# Patient Record
Sex: MALE | Race: WHITE | NOT HISPANIC OR LATINO | Employment: UNEMPLOYED | URBAN - NONMETROPOLITAN AREA
[De-identification: names, ages, dates, MRNs, and addresses within clinical notes are randomized per-mention and may not be internally consistent; named-entity substitution may affect disease eponyms.]

---

## 2017-06-01 ENCOUNTER — HOSPITAL ENCOUNTER (EMERGENCY)
Facility: HOSPITAL | Age: 37
Discharge: HOME | End: 2017-06-01
Attending: EMERGENCY MEDICINE | Admitting: EMERGENCY MEDICINE

## 2017-06-01 ENCOUNTER — DOCUMENTATION (OUTPATIENT)
Dept: EMERGENCY DEPT | Facility: HOSPITAL | Age: 37
End: 2017-06-01

## 2017-06-01 VITALS
DIASTOLIC BLOOD PRESSURE: 69 MMHG | HEART RATE: 102 BPM | TEMPERATURE: 98.2 F | WEIGHT: 160 LBS | OXYGEN SATURATION: 100 % | HEIGHT: 71 IN | RESPIRATION RATE: 20 BRPM | BODY MASS INDEX: 22.4 KG/M2 | SYSTOLIC BLOOD PRESSURE: 122 MMHG

## 2017-06-01 DIAGNOSIS — F19.10 DRUG ABUSE (HCC): Primary | ICD-10-CM

## 2017-06-01 LAB
ALBUMIN SERPL-MCNC: 4.6 G/DL (ref 3.5–5)
ALBUMIN/GLOB SERPL: 1.4 G/DL (ref 1–2)
ALP SERPL-CCNC: 86 U/L (ref 38–126)
ALT SERPL W P-5'-P-CCNC: 76 U/L (ref 13–69)
AMPHET+METHAMPHET UR QL: NEGATIVE
AMPHETAMINES UR QL: NEGATIVE
ANION GAP SERPL CALCULATED.3IONS-SCNC: 16.7 MMOL/L
AST SERPL-CCNC: 45 U/L (ref 15–46)
BARBITURATES UR QL SCN: NEGATIVE
BASOPHILS # BLD AUTO: 0.03 10*3/MM3 (ref 0–0.2)
BASOPHILS NFR BLD AUTO: 0.3 % (ref 0–2.5)
BENZODIAZ UR QL SCN: NEGATIVE
BILIRUB SERPL-MCNC: 0.6 MG/DL (ref 0.2–1.3)
BILIRUB UR QL STRIP: NEGATIVE
BUN BLD-MCNC: 11 MG/DL (ref 7–20)
BUN/CREAT SERPL: 13.8 (ref 6.3–21.9)
BUPRENORPHINE SERPL-MCNC: NEGATIVE NG/ML
CALCIUM SPEC-SCNC: 10.3 MG/DL (ref 8.4–10.2)
CANNABINOIDS SERPL QL: POSITIVE
CHLORIDE SERPL-SCNC: 106 MMOL/L (ref 98–107)
CLARITY UR: CLEAR
CO2 SERPL-SCNC: 22 MMOL/L (ref 26–30)
COCAINE UR QL: NEGATIVE
COLOR UR: YELLOW
CREAT BLD-MCNC: 0.8 MG/DL (ref 0.6–1.3)
DEPRECATED RDW RBC AUTO: 41.3 FL (ref 37–54)
EOSINOPHIL # BLD AUTO: 0 10*3/MM3 (ref 0–0.7)
EOSINOPHIL NFR BLD AUTO: 0 % (ref 0–7)
ERYTHROCYTE [DISTWIDTH] IN BLOOD BY AUTOMATED COUNT: 12.8 % (ref 11.5–14.5)
ETHANOL BLD-MCNC: <10 MG/DL
ETHANOL UR QL: <0.01 %
GFR SERPL CREATININE-BSD FRML MDRD: 109 ML/MIN/1.73
GLOBULIN UR ELPH-MCNC: 3.4 GM/DL
GLUCOSE BLD-MCNC: 109 MG/DL (ref 74–98)
GLUCOSE UR STRIP-MCNC: NEGATIVE MG/DL
HCT VFR BLD AUTO: 46.7 % (ref 42–52)
HGB BLD-MCNC: 15.9 G/DL (ref 14–18)
HGB UR QL STRIP.AUTO: NEGATIVE
IMM GRANULOCYTES # BLD: 0.03 10*3/MM3 (ref 0–0.06)
IMM GRANULOCYTES NFR BLD: 0.3 % (ref 0–0.6)
KETONES UR QL STRIP: ABNORMAL
LEUKOCYTE ESTERASE UR QL STRIP.AUTO: NEGATIVE
LYMPHOCYTES # BLD AUTO: 1.38 10*3/MM3 (ref 0.6–3.4)
LYMPHOCYTES NFR BLD AUTO: 12.9 % (ref 10–50)
MAGNESIUM SERPL-MCNC: 2.1 MG/DL (ref 1.6–2.3)
MCH RBC QN AUTO: 29.9 PG (ref 27–31)
MCHC RBC AUTO-ENTMCNC: 34 G/DL (ref 30–37)
MCV RBC AUTO: 87.9 FL (ref 80–94)
METHADONE UR QL SCN: NEGATIVE
MONOCYTES # BLD AUTO: 0.64 10*3/MM3 (ref 0–0.9)
MONOCYTES NFR BLD AUTO: 6 % (ref 0–12)
NEUTROPHILS # BLD AUTO: 8.64 10*3/MM3 (ref 2–6.9)
NEUTROPHILS NFR BLD AUTO: 80.5 % (ref 37–80)
NITRITE UR QL STRIP: NEGATIVE
NRBC BLD MANUAL-RTO: 0 /100 WBC (ref 0–0)
OPIATES UR QL: NEGATIVE
OXYCODONE UR QL SCN: NEGATIVE
PCP UR QL SCN: NEGATIVE
PH UR STRIP.AUTO: 6 [PH] (ref 5–8)
PLATELET # BLD AUTO: 259 10*3/MM3 (ref 130–400)
PMV BLD AUTO: 9.5 FL (ref 6–12)
POTASSIUM BLD-SCNC: 3.7 MMOL/L (ref 3.5–5.1)
PROPOXYPH UR QL: NEGATIVE
PROT SERPL-MCNC: 8 G/DL (ref 6.3–8.2)
PROT UR QL STRIP: NEGATIVE
RBC # BLD AUTO: 5.31 10*6/MM3 (ref 4.7–6.1)
SODIUM BLD-SCNC: 141 MMOL/L (ref 137–145)
SP GR UR STRIP: 1.01 (ref 1–1.03)
TRICYCLICS UR QL SCN: NEGATIVE
UROBILINOGEN UR QL STRIP: ABNORMAL
WBC NRBC COR # BLD: 10.72 10*3/MM3 (ref 4.8–10.8)

## 2017-06-01 PROCEDURE — 80306 DRUG TEST PRSMV INSTRMNT: CPT | Performed by: PHYSICIAN ASSISTANT

## 2017-06-01 PROCEDURE — 36415 COLL VENOUS BLD VENIPUNCTURE: CPT

## 2017-06-01 PROCEDURE — 85025 COMPLETE CBC W/AUTO DIFF WBC: CPT | Performed by: PHYSICIAN ASSISTANT

## 2017-06-01 PROCEDURE — 80307 DRUG TEST PRSMV CHEM ANLYZR: CPT | Performed by: PHYSICIAN ASSISTANT

## 2017-06-01 PROCEDURE — 80053 COMPREHEN METABOLIC PANEL: CPT | Performed by: PHYSICIAN ASSISTANT

## 2017-06-01 PROCEDURE — 99283 EMERGENCY DEPT VISIT LOW MDM: CPT

## 2017-06-01 PROCEDURE — 83735 ASSAY OF MAGNESIUM: CPT | Performed by: PHYSICIAN ASSISTANT

## 2017-06-01 PROCEDURE — 81003 URINALYSIS AUTO W/O SCOPE: CPT | Performed by: PHYSICIAN ASSISTANT

## 2017-06-02 NOTE — ED NOTES
"[id number]    DATA:  Behavioral Health Navigator received request for behavioral health consult from [facility] ED staff, STEPHANIE Clancy.  Navigator met with patient at bedside.  Patient presents to ED for withdrawal symptoms.  Patient states he has been using heroin off and on for 1 month.  He reports he \"just didn't think it was that bad, and I wanted to try it.\"  Patient reports him and his wife both having been struggling with substance abuse off and on, and he reports he is \"motivated to stop, but I wanted some medicine to help.\"  Patient reports he has been using both marijuana and heroin IV for 1 month with last use being 9AM on this day (06/01/17).  Patients toxicology report suggests the patient is positive for THC but negative for all other illicit drugs.  Patient reports some concerns related to housing, but is denying all other high risk indicators, and is denying all high risk psychiatric indicators.   Patient is currently resting comfortably at this time.      ASSESSMENT:  Upon assessment, patient is alert and oriented x3.  Patient is denying VH/AH and does not appear to be experiencing any perceptual disturbances.  Patient appears to be resting comfortably.   Patient affect is cooperative, calm.  He reports feeling \"restless\" and is having difficulty sleeping.  He self reports \"I have yawing and a runny nose\" although these were not noted during assessment.  Patient appears to be resting comfortably, no other withdrawal symptom were noted or visible at this time.  Patient states multiple times he would like to be treated for \"help sleeping\" and \"It would be all right if I just got some medicine here.\"  He reports he has work tomorrow and throughout the week at a [employer] company and is not interested in missing a lot of work but is wanting medication for sleep.  Patient is actively denying SI/HI.  Navigator administered CSSRS for suicide risk assessment.  The results of patient’s CSSRS " suggest that patient is denying all high risk psychiatric indicators.  Given the patient denying most COWS assessment points, his negative toxicology screen, vitals, and collective assessment, it is recommended the patient participate in IOP or an outpatient program.  Patient reports to be agreeable for tx.      PLAN:  At this time, this Navigator recommends outpatient treatment based upon patients symptoms, and reports of now attempting IOP or outpatient treatment before, and lack of high risk indicators warranting hospitalization..  Patient reports to be agreeable for tx.  Navigator informed [facility] ED staff, RADHA Salgado, STEPHANIE Forrester who are agreeable to plan.  Navigator provided the patient with [facility] IOP information.  Navigator also provided patient with local substance abuse resources, both inpatient, outpatient and self referral for rehabilitations.  Patient is also provided crisis hotlines, and encouraged the patient to return to the ED in any case of worsening symptoms or in any emergency.  Patient is agreeable, and resting in bed. STEPHANIE Forrester notified that patient has behavioral health resources.    -MICHELE Otoole.     MICHELE Easton  06/01/17 [id number]

## 2017-06-02 NOTE — ED PROVIDER NOTES
Subjective   HPI Comments: 37-year-old male who uses heroin for approximately a month last used this morning, he states that he is going to withdraw body aches diarrhea feels sluggish and runny nose.  He is requesting treatment for opiate withdrawal and wants to get off of heroin.  He reports that his wife was here earlier and transfer to [facility] for treatment      History provided by:  Patient   used: No        Review of Systems   Psychiatric/Behavioral:        Heroin withdrawal   All other systems reviewed and are negative.      Past Medical History:   Diagnosis Date   • Substance abuse    • TBI (traumatic brain injury) 2007       Allergies   Allergen Reactions   • Penicillins Hives       Past Surgical History:   Procedure Laterality Date   • FRACTURE SURGERY      COLLARBONE; CADAVER        History reviewed. No pertinent family history.    Social History     Social History   • Marital status:      Spouse name: N/A   • Number of children: N/A   • Years of education: N/A     Social History Main Topics   • Smoking status: Current Some Day Smoker     Packs/day: 0.50     Types: Cigarettes   • Smokeless tobacco: None   • Alcohol use No   • Drug use: Yes     Special: Marijuana, IV      Comment: HEROIN IV USE FOR 1 MONTH; SMOKED MARIJUANA TODAY TO HELP WITH WITHRAWAL    • Sexual activity: Not Asked     Other Topics Concern   • None     Social History Narrative   • None           Objective   Physical Exam   Constitutional: He is oriented to person, place, and time. He appears well-developed and well-nourished.   HENT:   Head: Normocephalic and atraumatic.   Left Ear: External ear normal.   Eyes: EOM are normal. Pupils are equal, round, and reactive to light.   Neck: Normal range of motion.   Cardiovascular: Normal rate and regular rhythm.    Pulmonary/Chest: Effort normal and breath sounds normal.   Abdominal: Soft. Bowel sounds are normal.   Musculoskeletal: Normal range of  motion.   Neurological: He is alert and oriented to person, place, and time.   Skin: Skin is warm and dry.   Psychiatric: He has a normal mood and affect. His behavior is normal. Judgment and thought content normal.   Nursing note and vitals reviewed.      Procedures         ED Course  ED Course                  MDM    Final diagnoses:   Drug abuse            Bruno Forrester Jr., KHURRAM  06/01/17 1174